# Patient Record
Sex: FEMALE | URBAN - METROPOLITAN AREA
[De-identification: names, ages, dates, MRNs, and addresses within clinical notes are randomized per-mention and may not be internally consistent; named-entity substitution may affect disease eponyms.]

---

## 2021-05-25 ENCOUNTER — DOCUMENTATION ONLY (OUTPATIENT)
Dept: CONSULT | Facility: CLINIC | Age: 81
End: 2021-05-25

## 2021-06-10 NOTE — PROGRESS NOTES
I met with Ludy's son Zeke previously, where genetic testing was ordered. See his chart for details. 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    We discussed that parental testing for the above duplication may give more information about its significance. If one of Zeke's parents has the same duplication (parents have no mitochondrial condition symptoms), we would be more suspicious that this change is benign and not related to Zeke's history. Parental testing may or may not be enough to reclassify this VUS on its own. [facility] noted the option of no charge parental testing for this duplication and sample type allowed for parental testing depends on the size of the duplication. If the duplication is smaller, based on the methodology [facility] uses to detect different chromosome changes, parental targeted testing would be at no charge and could be from a buccal sample. However, if the duplication is larger and a different methodology required for parental targeted testing, a blood sample would be needed and this would be at a cost for parents. [facility] shared with me the option of sending in parental samples via buccal sample now with Zeke's microarray testing, and they would attempt to use them pending the duplication size found on Zeke's microarray testing. If parental targeted testing not able to be done due to the testing methodology needed, these buccal samples would not be used, and we could discuss the option for parental testing via blood sample at a charge (self pay $250).     After discussion of options, Zeke elected to proceed with chromosomal microarray testing for himself and send in parental buccal samples in case no charge parental targeted testing could be completed.  Consent for genetic testing was obtained verbally and sent to be scanned into Zeke's chart. [facility] is able to use Zeke's previous sample/data for this additional test; a new sample is not needed for him. [facility] will  conduct a benefits investigation. If estimated out of pocket cost is >$100, Zeke will be contacted by [facility] directly. [facility]'s billing process was briefly reviewed today. Results will take ~3 weeks once initiated. I will wait to start testing until I have connected with Zeke parents and buccal samples sent to them (as their samples would need to be received back from [facility] while Zeke's testing is in process). Once available, I will call Zeke with results. Follow-up will depend on his testing results and if parental sample could be used.      Silvana House MS, [facility]  Genetic Counseling  Genetics and Metabolism Division  [facility]   Phone: [phone]  Pager: [phone]  Email: [email or web address]